# Patient Record
Sex: FEMALE | Race: OTHER | HISPANIC OR LATINO | Employment: FULL TIME | URBAN - METROPOLITAN AREA
[De-identification: names, ages, dates, MRNs, and addresses within clinical notes are randomized per-mention and may not be internally consistent; named-entity substitution may affect disease eponyms.]

---

## 2019-03-06 ENCOUNTER — HOSPITAL ENCOUNTER (EMERGENCY)
Facility: HOSPITAL | Age: 24
Discharge: HOME | End: 2019-03-06
Attending: EMERGENCY MEDICINE | Admitting: EMERGENCY MEDICINE
Payer: COMMERCIAL

## 2019-03-06 VITALS
WEIGHT: 223.55 LBS | TEMPERATURE: 101.4 F | HEART RATE: 128 BPM | BODY MASS INDEX: 38.16 KG/M2 | RESPIRATION RATE: 24 BRPM | OXYGEN SATURATION: 98 % | SYSTOLIC BLOOD PRESSURE: 167 MMHG | HEIGHT: 64 IN | DIASTOLIC BLOOD PRESSURE: 77 MMHG

## 2019-03-06 DIAGNOSIS — R50.9 FEVER: Primary | ICD-10-CM

## 2019-03-06 DIAGNOSIS — B34.9 VIRAL SYNDROME: ICD-10-CM

## 2019-03-06 PROCEDURE — 99283 EMERGENCY DEPT VISIT LOW MDM: CPT

## 2019-03-06 RX ORDER — ACETAMINOPHEN 500 MG
1000 TABLET ORAL EVERY 6 HOURS PRN
Qty: 30 TABLET | Refills: 0 | Status: SHIPPED | OUTPATIENT
Start: 2019-03-06

## 2019-03-06 RX ORDER — ONDANSETRON 4 MG/1
4 TABLET, ORALLY DISINTEGRATING ORAL EVERY 8 HOURS PRN
Qty: 12 TABLET | Refills: 0 | Status: SHIPPED | OUTPATIENT
Start: 2019-03-06 | End: 2019-04-05

## 2019-03-06 RX ORDER — ALBUTEROL SULFATE 90 UG/1
2 AEROSOL, METERED RESPIRATORY (INHALATION) EVERY 4 HOURS PRN
Qty: 1 INHALER | Refills: 0 | Status: SHIPPED | OUTPATIENT
Start: 2019-03-06 | End: 2020-03-05

## 2019-03-06 RX ORDER — OSELTAMIVIR PHOSPHATE 75 MG/1
75 CAPSULE ORAL EVERY 12 HOURS
Qty: 10 CAPSULE | Refills: 0 | Status: SHIPPED | OUTPATIENT
Start: 2019-03-06 | End: 2019-03-11

## 2019-03-06 RX ORDER — ALBUTEROL SULFATE 2.5 MG/3ML
2.5 SOLUTION RESPIRATORY (INHALATION) ONCE
Status: COMPLETED | OUTPATIENT
Start: 2019-03-06 | End: 2019-03-06

## 2019-03-06 RX ORDER — ACETAMINOPHEN 325 MG/1
975 TABLET ORAL ONCE
Status: COMPLETED | OUTPATIENT
Start: 2019-03-06 | End: 2019-03-06

## 2019-03-06 RX ADMIN — ALBUTEROL SULFATE 2.5 MG: 2.5 SOLUTION RESPIRATORY (INHALATION) at 11:56

## 2019-03-06 RX ADMIN — ACETAMINOPHEN 975 MG: 325 TABLET, FILM COATED ORAL at 11:55

## 2019-03-06 NOTE — ED NOTES
Provider at bedside     Gómez Pérez, [facility]  03/06/19 3462

## 2019-03-06 NOTE — ED NOTES
Discharged reviewed with pt  Pt verbalized understanding and has no further questions at this time  Pt ambulatory off unit with steady gait       Ruben Guadalupe RN  03/06/19 1309

## 2019-03-06 NOTE — ED NOTES
As per patient her girlfriend was diagnosed with type A influenza yesterday and today she is presenting with "chills, body aches, productive cough with clear mucous, and greenish mucous from her nasal, fever" She denies taking anything medication for pain or fever         Kaiden Ashby RN  03/06/19 6544

## 2019-03-06 NOTE — ED PROVIDER NOTES
History  Chief Complaint   Patient presents with    Flu Symptoms     pts s/o was diagnosed with Flu A  yesterday, pt started with flue like symptoms today, cough, fever, body aches, chills  has not taken any medications  History provided by:  Patient  Flu Symptoms   Presenting symptoms: fatigue, fever, myalgias and rhinorrhea    Presenting symptoms: no cough, no diarrhea, no sore throat and no vomiting    Fever:     Duration:  1 day    Timing:  Constant    Temp source:  Oral    Progression:  Unchanged  Myalgias:     Location:  Generalized    Quality:  Aching    Severity:  Moderate    Onset quality:  Gradual    Duration:  1 day  Associated symptoms: chills, decreased appetite and nasal congestion    Associated symptoms: no mental status change and no neck stiffness    Risk factors: sick contacts (relative was diagnosed with Flu A be rapid test yesterday at Urgent Care, she stared wtih same symptoms today)    Risk factors: no diabetes problem, no heart disease and no immunocompromised state        None       Past Medical History:   Diagnosis Date    Asthma        Past Surgical History:   Procedure Laterality Date    TONSILLECTOMY         History reviewed  No pertinent family history  I have reviewed and agree with the history as documented  Social History     Tobacco Use    Smoking status: Never Smoker    Smokeless tobacco: Never Used   Substance Use Topics    Alcohol use: Never     Frequency: Never    Drug use: Never        Review of Systems   Constitutional: Positive for chills, decreased appetite, fatigue and fever  HENT: Positive for congestion and rhinorrhea  Negative for sore throat  Respiratory: Negative for cough  Gastrointestinal: Negative for diarrhea and vomiting  Musculoskeletal: Positive for myalgias  Negative for neck stiffness  All other systems reviewed and are negative  Physical Exam  Physical Exam   Constitutional: She is oriented to person, place, and time   She appears well-developed and well-nourished  No distress  HENT:   Head: Normocephalic and atraumatic  Right Ear: Tympanic membrane normal    Left Ear: Tympanic membrane normal    Eyes: Pupils are equal, round, and reactive to light  Neck: Neck supple  Cardiovascular: Regular rhythm  Tachycardia present  Pulmonary/Chest: Effort normal  No respiratory distress  She has wheezes (occasional scattered wheeze, did not bring her inhaler with me)  Abdominal: Soft  She exhibits no distension  There is no tenderness  Neurological: She is alert and oriented to person, place, and time  Skin: Skin is warm  She is not diaphoretic  Psychiatric: She has a normal mood and affect  Vitals reviewed  Vital Signs  ED Triage Vitals   Temperature Pulse Respirations Blood Pressure SpO2   03/06/19 1049 03/06/19 1049 03/06/19 1049 03/06/19 1155 03/06/19 1049   (!) 101 4 °F (38 6 °C) (!) 128 (!) 24 167/77 98 %      Temp Source Heart Rate Source Patient Position - Orthostatic VS BP Location FiO2 (%)   03/06/19 1049 -- -- -- --   Oral          Pain Score       03/06/19 1049       No Pain           Vitals:    03/06/19 1049 03/06/19 1155   BP:  167/77   Pulse: (!) 128        Visual Acuity      ED Medications  Medications   acetaminophen (TYLENOL) tablet 975 mg (975 mg Oral Given 3/6/19 1155)   albuterol inhalation solution 2 5 mg (2 5 mg Nebulization Given 3/6/19 1156)       Diagnostic Studies  Results Reviewed     None                 No orders to display              Procedures  Procedures       Phone Contacts  ED Phone Contact    ED Course                               MDM  Number of Diagnoses or Management Options  Fever: new and does not require workup  Viral syndrome: new and does not require workup  Diagnosis management comments: Pt with flu like sx starting day after relative diagnosed with Flu A, so likely flu  Did not get flu shot   D/w her taking tylenol for fever/myalgias, offered tamiflu after risk benefit discussion if taken with first 48 may improved flu sx/duration but are side effects/cost  Relative developed N/V so will write for zofran as well  Pt without cough currently, slight wheeze, but didn't take her inhaler  D/w her worsening si/sx to return for including inability to take po, worsening cough, developing SOB, etc       Disposition  Final diagnoses:   Fever   Viral syndrome     Time reflects when diagnosis was documented in both MDM as applicable and the Disposition within this note     Time User Action Codes Description Comment    3/6/2019 12:06 PM Ardelia Spine Add [R50 9] Fever     3/6/2019 12:06 PM Pepe Ates M Add [B34 9] Viral syndrome       ED Disposition     ED Disposition Condition Date/Time Comment    Discharge Stable Wed Mar 6, 2019 12:06 PM Michell Livingston discharge to home/self care              Follow-up Information     Follow up With Specialties Details Why Contact Info Additional [address] [facility] Emergency Department Emergency Medicine Go to  If symptoms worsen [address] [facility] [address] ED, [address] [place] [address], [facility]          Discharge Medication List as of 3/6/2019 12:07 PM      START taking these medications    Details   acetaminophen (TYLENOL) 500 mg tablet Take 2 tablets (1,000 mg total) by mouth every 6 (six) hours as needed for mild pain, Starting Wed 3/6/2019, Print      albuterol (PROVENTIL HFA,VENTOLIN HFA) 90 mcg/act inhaler Inhale 2 puffs every 4 (four) hours as needed for wheezing, Starting Wed 3/6/2019, Until Thu 3/5/2020, Print      ondansetron (ZOFRAN-ODT) 4 mg disintegrating tablet Take 1 tablet (4 mg total) by mouth every 8 (eight) hours as needed for nausea or vomiting for up to 30 days, Starting Wed 3/6/2019, Until Fri 4/5/2019, Print      oseltamivir (TAMIFLU) 75 mg capsule Take 1 capsule (75 mg total) by mouth every 12 (twelve) hours for 5 days, Starting RENAE Woodruff 3/6/2019, Until Mon 3/11/2019, Print           No discharge procedures on file      ED Provider  Electronically Signed by           King Zhang MD  03/06/19 6940